# Patient Record
Sex: MALE | Race: ASIAN | NOT HISPANIC OR LATINO
[De-identification: names, ages, dates, MRNs, and addresses within clinical notes are randomized per-mention and may not be internally consistent; named-entity substitution may affect disease eponyms.]

---

## 2019-03-28 PROBLEM — Z00.00 ENCOUNTER FOR PREVENTIVE HEALTH EXAMINATION: Status: ACTIVE | Noted: 2019-03-28

## 2019-04-01 ENCOUNTER — APPOINTMENT (OUTPATIENT)
Dept: OTOLARYNGOLOGY | Facility: CLINIC | Age: 24
End: 2019-04-01
Payer: COMMERCIAL

## 2019-04-01 VITALS
DIASTOLIC BLOOD PRESSURE: 76 MMHG | TEMPERATURE: 97.6 F | BODY MASS INDEX: 20.73 KG/M2 | SYSTOLIC BLOOD PRESSURE: 118 MMHG | HEART RATE: 73 BPM | HEIGHT: 69 IN | WEIGHT: 140 LBS

## 2019-04-01 PROCEDURE — 99203 OFFICE O/P NEW LOW 30 MIN: CPT

## 2019-04-01 RX ORDER — ASCORBIC ACID 500 MG
TABLET,CHEWABLE ORAL DAILY
Qty: 30 | Refills: 3 | Status: ACTIVE | COMMUNITY
Start: 2019-04-01 | End: 1900-01-01

## 2019-04-01 RX ORDER — TRIAMCINOLONE ACETONIDE 55 UG/1
55 SPRAY, METERED NASAL
Qty: 1 | Refills: 3 | Status: ACTIVE | COMMUNITY
Start: 2019-04-01 | End: 1900-01-01

## 2019-04-01 NOTE — PHYSICAL EXAM
[de-identified] : bilateral hypertrophy [de-identified] : septal deviation to left, carlos hypertrophy inferior turbs [Normal] : assessment of respiratory effort is normal

## 2019-04-01 NOTE — HISTORY OF PRESENT ILLNESS
[de-identified] : 24 yo male, inability to breathe through his nose, watery rhinorrhea worse when season changes, and hx of allergy testing showing multiple allergies . occasional but rare sinusitis, need of abx once a year. 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 Interested in surgery

## 2019-04-01 NOTE — HISTORY OF PRESENT ILLNESS
[de-identified] : 24 yo male, inability to breathe through his nose, watery rhinorrhea worse when season changes, and hx of allergy testing showing multiple allergies . occasional but rare sinusitis, need of abx once a year. 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 Interested in surgery

## 2019-04-01 NOTE — ASSESSMENT
[FreeTextEntry1] : 24 yo male, Allergic rhinitis and mild septal deviation\par \par 1.Allergy testing -Dr Dudley\par 2.Ct scan sinus\par 3. Nasacort daily use\par \par RTC with results\par

## 2019-04-01 NOTE — PHYSICAL EXAM
[de-identified] : bilateral hypertrophy [de-identified] : septal deviation to left, carlos hypertrophy inferior turbs [Normal] : assessment of respiratory effort is normal

## 2019-04-09 ENCOUNTER — FORM ENCOUNTER (OUTPATIENT)
Age: 24
End: 2019-04-09

## 2019-04-10 ENCOUNTER — APPOINTMENT (OUTPATIENT)
Dept: CT IMAGING | Facility: HOSPITAL | Age: 24
End: 2019-04-10
Payer: SELF-PAY

## 2019-04-10 ENCOUNTER — OUTPATIENT (OUTPATIENT)
Dept: OUTPATIENT SERVICES | Facility: HOSPITAL | Age: 24
LOS: 1 days | End: 2019-04-10
Payer: COMMERCIAL

## 2019-04-10 PROCEDURE — 70486 CT MAXILLOFACIAL W/O DYE: CPT | Mod: 26

## 2019-04-10 PROCEDURE — 70486 CT MAXILLOFACIAL W/O DYE: CPT

## 2019-04-26 ENCOUNTER — APPOINTMENT (OUTPATIENT)
Dept: OTOLARYNGOLOGY | Facility: CLINIC | Age: 24
End: 2019-04-26
Payer: COMMERCIAL

## 2019-04-26 VITALS
HEART RATE: 82 BPM | DIASTOLIC BLOOD PRESSURE: 63 MMHG | BODY MASS INDEX: 20.73 KG/M2 | HEIGHT: 69 IN | WEIGHT: 140 LBS | SYSTOLIC BLOOD PRESSURE: 102 MMHG | OXYGEN SATURATION: 98 %

## 2019-04-26 PROCEDURE — 99212 OFFICE O/P EST SF 10 MIN: CPT

## 2019-05-01 DIAGNOSIS — J34.3 HYPERTROPHY OF NASAL TURBINATES: ICD-10-CM

## 2019-05-22 VITALS
TEMPERATURE: 97 F | RESPIRATION RATE: 16 BRPM | OXYGEN SATURATION: 99 % | WEIGHT: 136.25 LBS | HEIGHT: 70 IN | SYSTOLIC BLOOD PRESSURE: 101 MMHG | HEART RATE: 57 BPM | DIASTOLIC BLOOD PRESSURE: 62 MMHG

## 2019-05-23 ENCOUNTER — OUTPATIENT (OUTPATIENT)
Dept: OUTPATIENT SERVICES | Facility: HOSPITAL | Age: 24
LOS: 1 days | Discharge: HOME | End: 2019-05-23
Payer: COMMERCIAL

## 2019-05-23 ENCOUNTER — APPOINTMENT (OUTPATIENT)
Dept: OTOLARYNGOLOGY | Facility: HOSPITAL | Age: 24
End: 2019-05-23

## 2019-05-23 ENCOUNTER — RESULT REVIEW (OUTPATIENT)
Age: 24
End: 2019-05-23

## 2019-05-23 VITALS — RESPIRATION RATE: 17 BRPM | OXYGEN SATURATION: 99 % | HEART RATE: 75 BPM | TEMPERATURE: 98 F

## 2019-05-23 DIAGNOSIS — K08.409 PARTIAL LOSS OF TEETH, UNSPECIFIED CAUSE, UNSPECIFIED CLASS: Chronic | ICD-10-CM

## 2019-05-23 PROCEDURE — C1889: CPT

## 2019-05-23 PROCEDURE — 88300 SURGICAL PATH GROSS: CPT

## 2019-05-23 PROCEDURE — 30802 ABLATE INF TURBINATE SUBMUC: CPT

## 2019-05-23 PROCEDURE — 30801 ABLATE INF TURBINATE SUPERF: CPT | Mod: XS

## 2019-05-23 PROCEDURE — 30520 REPAIR OF NASAL SEPTUM: CPT

## 2019-05-23 PROCEDURE — 61782 SCAN PROC CRANIAL EXTRA: CPT

## 2019-05-23 RX ORDER — SODIUM CHLORIDE 9 MG/ML
1000 INJECTION, SOLUTION INTRAVENOUS
Refills: 0 | Status: DISCONTINUED | OUTPATIENT
Start: 2019-05-23 | End: 2019-05-23

## 2019-05-23 RX ORDER — ACETAMINOPHEN 500 MG
650 TABLET ORAL EVERY 6 HOURS
Refills: 0 | Status: DISCONTINUED | OUTPATIENT
Start: 2019-05-23 | End: 2019-05-23

## 2019-05-23 RX ORDER — HYDROMORPHONE HYDROCHLORIDE 2 MG/ML
0.5 INJECTION INTRAMUSCULAR; INTRAVENOUS; SUBCUTANEOUS
Refills: 0 | Status: DISCONTINUED | OUTPATIENT
Start: 2019-05-23 | End: 2019-05-23

## 2019-05-23 RX ORDER — ONDANSETRON 8 MG/1
4 TABLET, FILM COATED ORAL EVERY 6 HOURS
Refills: 0 | Status: DISCONTINUED | OUTPATIENT
Start: 2019-05-23 | End: 2019-05-23

## 2019-05-23 RX ORDER — OXYCODONE AND ACETAMINOPHEN 5; 325 MG/1; MG/1
1 TABLET ORAL EVERY 4 HOURS
Refills: 0 | Status: DISCONTINUED | OUTPATIENT
Start: 2019-05-23 | End: 2019-05-23

## 2019-05-23 RX ADMIN — SODIUM CHLORIDE 75 MILLILITER(S): 9 INJECTION, SOLUTION INTRAVENOUS at 13:11

## 2019-05-28 PROBLEM — J30.9 ALLERGIC RHINITIS, UNSPECIFIED: Chronic | Status: ACTIVE | Noted: 2019-05-22

## 2019-05-28 PROBLEM — J34.2 DEVIATED NASAL SEPTUM: Chronic | Status: ACTIVE | Noted: 2019-05-22

## 2019-05-29 ENCOUNTER — APPOINTMENT (OUTPATIENT)
Dept: OTOLARYNGOLOGY | Facility: CLINIC | Age: 24
End: 2019-05-29

## 2019-05-29 VITALS — OXYGEN SATURATION: 98 % | SYSTOLIC BLOOD PRESSURE: 117 MMHG | DIASTOLIC BLOOD PRESSURE: 75 MMHG | HEART RATE: 92 BPM

## 2019-05-29 LAB — SURGICAL PATHOLOGY STUDY: SIGNIFICANT CHANGE UP

## 2019-05-29 RX ORDER — SODIUM CHLORIDE 0.65 %
0.65 AEROSOL, SPRAY (ML) NASAL
Qty: 1 | Refills: 6 | Status: ACTIVE | COMMUNITY
Start: 2019-05-29 | End: 1900-01-01

## 2019-05-30 RX ORDER — OXYMETAZOLINE HCL 0.05 %
0.05 SPRAY, NON-AEROSOL (ML) NASAL
Qty: 1 | Refills: 5 | Status: ACTIVE | COMMUNITY
Start: 2019-05-29 | End: 1900-01-01

## 2019-06-10 ENCOUNTER — APPOINTMENT (OUTPATIENT)
Dept: OTOLARYNGOLOGY | Facility: CLINIC | Age: 24
End: 2019-06-10
Payer: COMMERCIAL

## 2019-06-10 DIAGNOSIS — J34.89 OTHER SPECIFIED DISORDERS OF NOSE AND NASAL SINUSES: ICD-10-CM

## 2019-06-10 PROCEDURE — 99024 POSTOP FOLLOW-UP VISIT: CPT

## 2019-06-10 RX ORDER — MUPIROCIN 20 MG/G
2 OINTMENT TOPICAL
Qty: 1 | Refills: 0 | Status: ACTIVE | COMMUNITY
Start: 2019-06-10 | End: 1900-01-01

## 2019-06-10 NOTE — PROCEDURE
[Topical Lidocaine] : topical lidocaine [Oxymetazoline HCl] : oxymetazoline HCl [FreeTextEntry1] : crusting on R [FreeTextEntry6] : s

## 2019-06-10 NOTE — HISTORY OF PRESENT ILLNESS
[de-identified] : 22 yo male, inability to breathe through his nose, watery rhinorrhea worse when season changes, and hx of allergy testing showing multiple allergies . occasional but rare sinusitis, need of abx once a year. 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 Interested in surgery\par \par  [FreeTextEntry1] : currently doing well\par no complaints. no rhinitis symptoms. breathing better

## 2019-06-10 NOTE — PHYSICAL EXAM
[de-identified] : bilateral hypertrophy [de-identified] : septal deviation to left, carlos hypertrophy inferior turbs [Normal] : assessment of respiratory effort is normal

## 2019-06-10 NOTE — ASSESSMENT
[FreeTextEntry1] : 22 yo male, Allergic rhinitis and mild septal deviation\par \par - currently doing well\par \par PLAN:\par - NaCl QID\par - mupirocin ointment R nose\par - continue zyrtec, afrin PRN. stop nasocort\par - RTC 1 week

## 2019-06-17 ENCOUNTER — APPOINTMENT (OUTPATIENT)
Dept: OTOLARYNGOLOGY | Facility: CLINIC | Age: 24
End: 2019-06-17
Payer: COMMERCIAL

## 2019-06-17 VITALS
SYSTOLIC BLOOD PRESSURE: 109 MMHG | WEIGHT: 140 LBS | DIASTOLIC BLOOD PRESSURE: 72 MMHG | OXYGEN SATURATION: 98 % | HEART RATE: 85 BPM | BODY MASS INDEX: 20.73 KG/M2 | HEIGHT: 69 IN

## 2019-06-17 PROCEDURE — 31231 NASAL ENDOSCOPY DX: CPT | Mod: 58

## 2019-06-17 PROCEDURE — 99024 POSTOP FOLLOW-UP VISIT: CPT

## 2019-06-18 NOTE — ASSESSMENT
[FreeTextEntry1] : 22 yo male, Allergic rhinitis and mild septal deviation\par \par - currently doing well\par \par PLAN:\par -f/u 1 month

## 2019-06-18 NOTE — PHYSICAL EXAM
[de-identified] : bilateral hypertrophy [de-identified] : septal deviation to left, carlos hypertrophy inferior turbs [Normal] : assessment of respiratory effort is normal

## 2019-06-18 NOTE — PROCEDURE
[Topical Lidocaine] : topical lidocaine [Oxymetazoline HCl] : oxymetazoline HCl [FreeTextEntry6] : no crusting. healing well. no bleeding

## 2019-06-18 NOTE — HISTORY OF PRESENT ILLNESS
[FreeTextEntry1] : currently doing well\par no complaints. no rhinitis symptoms. breathing better [de-identified] : 22 yo male, inability to breathe through his nose, watery rhinorrhea worse when season changes, and hx of allergy testing showing multiple allergies . occasional but rare sinusitis, need of abx once a year. 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 Interested in surgery\par \par

## 2019-07-15 ENCOUNTER — APPOINTMENT (OUTPATIENT)
Dept: OTOLARYNGOLOGY | Facility: CLINIC | Age: 24
End: 2019-07-15
Payer: COMMERCIAL

## 2019-07-15 VITALS
HEIGHT: 69 IN | WEIGHT: 140 LBS | SYSTOLIC BLOOD PRESSURE: 129 MMHG | HEART RATE: 85 BPM | OXYGEN SATURATION: 97 % | DIASTOLIC BLOOD PRESSURE: 72 MMHG | BODY MASS INDEX: 20.73 KG/M2

## 2019-07-15 DIAGNOSIS — J30.9 ALLERGIC RHINITIS, UNSPECIFIED: ICD-10-CM

## 2019-07-15 DIAGNOSIS — J34.2 DEVIATED NASAL SEPTUM: ICD-10-CM

## 2019-07-15 PROCEDURE — 99212 OFFICE O/P EST SF 10 MIN: CPT | Mod: 25

## 2019-07-15 PROCEDURE — 31231 NASAL ENDOSCOPY DX: CPT | Mod: 58

## 2019-07-15 NOTE — ASSESSMENT
[FreeTextEntry1] : 24M with a PMH of allergic rhinnitis and deviated septum, s/p septoplasty and SMR of INT 5/23/19.\par \par - F/u PRN

## 2019-07-15 NOTE — PHYSICAL EXAM
[Nasal Endoscopy Performed] : nasal endoscopy was performed, see procedure section for findings [Normal] : no abnormal secretions

## 2019-07-15 NOTE — HISTORY OF PRESENT ILLNESS
[de-identified] : 24M with a PMH of allergic rhinitis and deviated septum s/p septoplasty and SMR INT 5/23/19.  [FreeTextEntry1] : Since surgery, patient is breathing well and has no complaints.

## 2019-07-16 ENCOUNTER — TRANSCRIPTION ENCOUNTER (OUTPATIENT)
Age: 24
End: 2019-07-16

## 2021-10-17 ENCOUNTER — TRANSCRIPTION ENCOUNTER (OUTPATIENT)
Age: 26
End: 2021-10-17
